# Patient Record
Sex: MALE | Employment: UNEMPLOYED | URBAN - METROPOLITAN AREA
[De-identification: names, ages, dates, MRNs, and addresses within clinical notes are randomized per-mention and may not be internally consistent; named-entity substitution may affect disease eponyms.]

---

## 2024-02-22 ENCOUNTER — HOSPITAL ENCOUNTER (EMERGENCY)
Facility: HOSPITAL | Age: 34
Discharge: HOME | End: 2024-02-22
Payer: COMMERCIAL

## 2024-02-22 VITALS
SYSTOLIC BLOOD PRESSURE: 143 MMHG | RESPIRATION RATE: 16 BRPM | HEIGHT: 71 IN | BODY MASS INDEX: 21 KG/M2 | DIASTOLIC BLOOD PRESSURE: 99 MMHG | TEMPERATURE: 98.6 F | OXYGEN SATURATION: 98 % | WEIGHT: 150 LBS | HEART RATE: 85 BPM

## 2024-02-22 DIAGNOSIS — K08.89 PAIN, DENTAL: Primary | ICD-10-CM

## 2024-02-22 DIAGNOSIS — K08.89 TOOTHACHE: ICD-10-CM

## 2024-02-22 PROCEDURE — 99283 EMERGENCY DEPT VISIT LOW MDM: CPT

## 2024-02-22 RX ORDER — IBUPROFEN 600 MG/1
600 TABLET ORAL EVERY 6 HOURS PRN
Qty: 12 TABLET | Refills: 0 | Status: SHIPPED | OUTPATIENT
Start: 2024-02-22 | End: 2024-02-29

## 2024-02-22 RX ORDER — CLINDAMYCIN HYDROCHLORIDE 150 MG/1
450 CAPSULE ORAL EVERY 6 HOURS
Qty: 84 CAPSULE | Refills: 0 | Status: SHIPPED | OUTPATIENT
Start: 2024-02-22 | End: 2024-02-29

## 2024-02-22 ASSESSMENT — PAIN SCALES - GENERAL: PAINLEVEL_OUTOF10: 1

## 2024-02-22 ASSESSMENT — COLUMBIA-SUICIDE SEVERITY RATING SCALE - C-SSRS
1. IN THE PAST MONTH, HAVE YOU WISHED YOU WERE DEAD OR WISHED YOU COULD GO TO SLEEP AND NOT WAKE UP?: NO
6. HAVE YOU EVER DONE ANYTHING, STARTED TO DO ANYTHING, OR PREPARED TO DO ANYTHING TO END YOUR LIFE?: NO
2. HAVE YOU ACTUALLY HAD ANY THOUGHTS OF KILLING YOURSELF?: NO

## 2024-02-22 ASSESSMENT — LIFESTYLE VARIABLES
EVER FELT BAD OR GUILTY ABOUT YOUR DRINKING: NO
EVER HAD A DRINK FIRST THING IN THE MORNING TO STEADY YOUR NERVES TO GET RID OF A HANGOVER: NO
HAVE PEOPLE ANNOYED YOU BY CRITICIZING YOUR DRINKING: NO
HAVE YOU EVER FELT YOU SHOULD CUT DOWN ON YOUR DRINKING: NO

## 2024-02-22 ASSESSMENT — PAIN - FUNCTIONAL ASSESSMENT: PAIN_FUNCTIONAL_ASSESSMENT: 0-10

## 2024-02-22 ASSESSMENT — PAIN DESCRIPTION - LOCATION: LOCATION: MOUTH

## 2024-02-22 ASSESSMENT — PAIN DESCRIPTION - ORIENTATION: ORIENTATION: RIGHT;LOWER

## 2024-02-22 ASSESSMENT — PAIN DESCRIPTION - PAIN TYPE: TYPE: ACUTE PAIN

## 2024-02-23 NOTE — DISCHARGE INSTRUCTIONS
Be sure to follow up as directed in 1-2 days.  All of the details of your follow up instructions are detailed in the follow up section of this packet.     If you are being discharged with any pains medications or muscle relaxers (norco, Vicodin, hydrocodone products, Percocet, oxycodone products, flexeril, cyclobenzaprine, robaxin, norflex, brand or generic, or any other pain controlling medications with the exception of Ibuprofen and regular Tylenol, do not drive or operate machinery, climb ladders or participate in any activity that could potentially put yourself or others at risk should you get dizzy, or be/feel impaired at all.        It is important to remember that your care does not end here and you must continue to monitor your condition closely. Please return to the emergency department for any worsening or concerning signs or symptoms as directed by our conversations and the discharge instructions. Otherwise please follow up with your doctor in 2 days if no better or worse. If you do not have a doctor please contact the referral number on your discharge instructions. Please contact any physician specialists provided in your discharge notes as it is very important to follow up with them regarding your condition. If you are unable to reach the physicians provided, please come back to the Emergency Department at any time.        Return to emergency room without delay for ANY new or worsening pains or for any other symptoms or concerns.          Please follow up with your Dentist.  If you do not have a dentist below is a list of local dentists that accept Medicaid.    Low cost dental care at:    [facility] - Department of Dentistry  [address] [place] [address]  [phone]    Hours of Operations: 8:30 am - 5:00 pm weekdays      * [place] Medicaid  is accepted  * [place] are accepted  * No self pay patients unless they have an appointment set up with Financial Services  Department prior to their visit.  Self pay patients must be a [facility] Resident.  * Walk-ins are accepted weekdays only at 8 am and 12:30 pm.  All other times the patient must have a scheduled appointment.      [facility]  [address] on campus  [phone]    Hours of Operations:  10 am - 4 pm, weekdays    * [place] Medicaid is accepted  * Other dental insurance area accepted  * No walk-ins.  All patients MUST HAVE scheduled appointment.      [facility]  [address] [place] [address] [address] ([facility]) [place], [place] [id number] [phone]    Hours of Operations:  Monday - Friday        9 am - 5 pm    * [place] Medicaid is not accepted  * Other dental insurances are accepted  * All patients are seen by appointment only  * All patients area able to set up a financial payment plan with [facility] through [facility].      Dr. Rudy Shepherd - [facility]  [address] [place] [address] [address] ([facility]) [place] [address]  [phone]    * [place] Medicaid is acceptable  * Other dentail insurances are accepted  * V[employer] & [employer] accepted  * EMERGENCIES AND NEW PATIENTS WELCOME      [facility]  [address] [address] [phone]    Hours of Operations:  Patients are seen in the Dental Clinic on Tuesday mornings only and must have an appointment    * No Walk-ins  * [place] Medicaid is acceptable  * Other dental insurances are acceptable  * Self pay patients are set up on a sliding scale according to family income.    Or you may see your dentist.

## 2024-02-23 NOTE — ED PROVIDER NOTES
HPI   Chief Complaint   Patient presents with    Dental Pain     Patient states for the last 6-8 days having right lower mouth pain and feels may have an abscess.       HPI  33-year-old male here for evaluation of dental pain, 68 days of right lower dental pain, has very poor dentition with diffuse dental caries, he knows that he needs to see a dentist but has not yet done so.  Denies any fevers or chills no trouble swallowing or breathing.                  B[place] Coma Scale Score: 15                     Patient History   History reviewed. No pertinent past medical history.  History reviewed. No pertinent surgical history.  No family history on file.  Social History     Tobacco Use    Smoking status: Every Day     Types: Cigarettes    Smokeless tobacco: Never   Substance Use Topics    Alcohol use: Yes    Drug use: Yes     Types: Marijuana       Physical Exam   ED Triage Vitals [02/22/24 2036]   Temperature Heart Rate Respirations BP   37 °C (98.6 °F) 85 16 (!) 143/99      Pulse Ox Temp Source Heart Rate Source Patient Position   98 % Temporal Monitor Sitting      BP Location FiO2 (%)     Left arm --       Physical Exam  PHYSICAL EXAMINATION    GENERAL APPEARANCE: Awake and alert.     VITAL SIGNS: As per the nurses' triage record.     HEENT: Normocephalic, atraumatic. Extraocular muscles are intact. Pupils equal round and reactive to light. Conjunctiva are pink. Negative scleral icterus. Mucous membranes are moist. Tongue in the midline. Pharynx was without erythema or exudates, uvula midline very poor dentition with diffuse dental caries in all fields.    NECK: Soft Nontender and supple, full gross ROM, no meningeal signs.    CHEST: Nontender to palpation. Clear to auscultation bilaterally. No rales, rhonchi, or wheezing.     HEART: S1, S2. Regular rate and rhythm. No murmurs, gallops or rubs.  Strong and equal pulses in the extremities.     ABDOMEN: Soft, nontender, nondistended, positive bowel sounds, no palpable  masses.    MUSCULOSKELETAL: The calves are nontender to palpation. Full gross active range of motion. Ambulating on own with no acute difficulties     NEUROLOGICAL: Awake, alert and oriented x 3. Power intact in the upper and lower extremities. Sensation is intact to light touch in the upper and lower extremities.     IMMUNOLOGICAL: No lymphatic streaking noted     DERM: No petechiae, rashes, or ecchymoses.  ED Course & MDM   Diagnoses as of 02/22/24 2145   Pain, dental   Toothache       Medical Decision Making  Parts of this chart have been completed using voice recognition software. Please excuse any errors of transcription.  My thought process and reason for plan has been formulated from the time that I saw the patient until the time of disposition and is not specific to one specific moment during their visit and furthermore my MDM encompasses this entire chart and not only this text box.      HPI: Detailed above.    Exam: A medically appropriate exam performed, outlined above, given the known history and presentation.    History Limited by: Nothing    History obtained from: Patient    External/internal records reviewed: Records reviewed, nothing specific to today's visit    Social Determinants of Health considered during this visit: Lives at home    Chronic conditions impacting care: Chronic dentition problems    Medications given during visit:  Medications - No data to display     Diagnostic/tests  Labs Reviewed - No data to display   No orders to display          Prescription medications considered: Clindamycin and ibuprofen    Considerations/further MDM:  I estimate there is low risk for a anaphylaxis, deep space infection such as abscess, Josselin's, retropharyngeal abscess, epiglottitis, meningitis, peritonsillar abscess, angioedema, airway compromise, thus I consider the discharge disposition reasonable. Also, there is no evidence for sepsis, or toxicity. We have discussed the diagnosis and risks, and we  agree with discharging home to follow-up with their primary doctor or the specialist as directed. We also discussed returning to the Emergency Department immediately if new or worsening symptoms occur. We have discussed the symptoms which are most concerning (e.g., changing or worsening pain, drooling, trouble swallowing, or breathing, neck stiffness or fever) that necessitate immediate return.      Procedure  Procedures     Segundo Barbour PA-C  02/22/24 5626